# Patient Record
Sex: MALE | Race: WHITE | URBAN - METROPOLITAN AREA
[De-identification: names, ages, dates, MRNs, and addresses within clinical notes are randomized per-mention and may not be internally consistent; named-entity substitution may affect disease eponyms.]

---

## 2023-11-29 ENCOUNTER — APPOINTMENT (RX ONLY)
Dept: URBAN - METROPOLITAN AREA CLINIC 141 | Facility: CLINIC | Age: 58
Setting detail: DERMATOLOGY
End: 2023-11-29

## 2023-11-29 DIAGNOSIS — D49.2 NEOPLASM OF UNSPECIFIED BEHAVIOR OF BONE, SOFT TISSUE, AND SKIN: ICD-10-CM

## 2023-11-29 PROCEDURE — ? ADDITIONAL NOTES

## 2023-11-29 NOTE — PROCEDURE: ADDITIONAL NOTES
Additional Notes: 11/29/23 - pt states his pcp dx him with Pilonydal cyst without abscess, was placed on antibiotics unknown pt doesn’t know what it was. has now had cyst for 2 mths he states the RX made the cyst pop and he had some relief. Pcp referred him over for tx options advs he has to see GI Dr to remove cyst, referred to [facility] / Dr. Sintia Godwin.
Detail Level: Simple
Render Risk Assessment In Note?: no